# Patient Record
Sex: FEMALE | Race: WHITE
[De-identification: names, ages, dates, MRNs, and addresses within clinical notes are randomized per-mention and may not be internally consistent; named-entity substitution may affect disease eponyms.]

---

## 2020-12-30 ENCOUNTER — HOSPITAL ENCOUNTER (EMERGENCY)
Dept: HOSPITAL 63 - ER | Age: 70
Discharge: HOME | End: 2020-12-30
Payer: MEDICARE

## 2020-12-30 VITALS
DIASTOLIC BLOOD PRESSURE: 71 MMHG | SYSTOLIC BLOOD PRESSURE: 149 MMHG | SYSTOLIC BLOOD PRESSURE: 149 MMHG | DIASTOLIC BLOOD PRESSURE: 71 MMHG | DIASTOLIC BLOOD PRESSURE: 71 MMHG | SYSTOLIC BLOOD PRESSURE: 149 MMHG | SYSTOLIC BLOOD PRESSURE: 149 MMHG | DIASTOLIC BLOOD PRESSURE: 71 MMHG | SYSTOLIC BLOOD PRESSURE: 149 MMHG | DIASTOLIC BLOOD PRESSURE: 71 MMHG

## 2020-12-30 VITALS — HEIGHT: 67 IN | BODY MASS INDEX: 18.55 KG/M2 | WEIGHT: 118.17 LBS

## 2020-12-30 DIAGNOSIS — I10: Primary | ICD-10-CM

## 2020-12-30 DIAGNOSIS — Z88.8: ICD-10-CM

## 2020-12-30 DIAGNOSIS — Z88.7: ICD-10-CM

## 2020-12-30 LAB
ALBUMIN SERPL-MCNC: 3.8 G/DL (ref 3.4–5)
ALBUMIN/GLOB SERPL: 1.3 {RATIO} (ref 1–1.7)
ALP SERPL-CCNC: 81 U/L (ref 46–116)
ALT SERPL-CCNC: 17 U/L (ref 14–59)
ANION GAP SERPL CALC-SCNC: 13 MMOL/L (ref 6–14)
AST SERPL-CCNC: 21 U/L (ref 15–37)
BASOPHILS # BLD AUTO: 0 X10^3/UL (ref 0–0.2)
BASOPHILS NFR BLD: 1 % (ref 0–3)
BILIRUB SERPL-MCNC: 0.5 MG/DL (ref 0.2–1)
BUN/CREAT SERPL: 11 (ref 6–20)
CA-I SERPL ISE-MCNC: 9 MG/DL (ref 7–20)
CALCIUM SERPL-MCNC: 9.8 MG/DL (ref 8.5–10.1)
CHLORIDE SERPL-SCNC: 97 MMOL/L (ref 98–107)
CO2 SERPL-SCNC: 26 MMOL/L (ref 21–32)
CREAT SERPL-MCNC: 0.8 MG/DL (ref 0.6–1)
EOSINOPHIL NFR BLD: 0 X10^3/UL (ref 0–0.7)
EOSINOPHIL NFR BLD: 1 % (ref 0–3)
ERYTHROCYTE [DISTWIDTH] IN BLOOD BY AUTOMATED COUNT: 15.6 % (ref 11.5–14.5)
GFR SERPLBLD BASED ON 1.73 SQ M-ARVRAT: 71.3 ML/MIN
GLOBULIN SER-MCNC: 3 G/DL (ref 2.2–3.8)
GLUCOSE SERPL-MCNC: 112 MG/DL (ref 70–99)
HCT VFR BLD CALC: 43.2 % (ref 36–47)
HGB BLD-MCNC: 14.3 G/DL (ref 12–15.5)
LYMPHOCYTES # BLD: 1.1 X10^3/UL (ref 1–4.8)
LYMPHOCYTES NFR BLD AUTO: 17 % (ref 24–48)
MCH RBC QN AUTO: 33 PG (ref 25–35)
MCHC RBC AUTO-ENTMCNC: 33 G/DL (ref 31–37)
MCV RBC AUTO: 99 FL (ref 79–100)
MONO #: 0.5 X10^3/UL (ref 0–1.1)
MONOCYTES NFR BLD: 8 % (ref 0–9)
NEUT #: 5 X10^3UL (ref 1.8–7.7)
NEUTROPHILS NFR BLD AUTO: 74 % (ref 31–73)
PLATELET # BLD AUTO: 211 X10^3/UL (ref 140–400)
POTASSIUM SERPL-SCNC: 4.2 MMOL/L (ref 3.5–5.1)
PROT SERPL-MCNC: 6.8 G/DL (ref 6.4–8.2)
RBC # BLD AUTO: 4.37 X10^6/UL (ref 3.5–5.4)
SODIUM SERPL-SCNC: 136 MMOL/L (ref 136–145)
WBC # BLD AUTO: 6.7 X10^3/UL (ref 4–11)

## 2020-12-30 PROCEDURE — 80053 COMPREHEN METABOLIC PANEL: CPT

## 2020-12-30 PROCEDURE — 85025 COMPLETE CBC W/AUTO DIFF WBC: CPT

## 2020-12-30 PROCEDURE — 99285 EMERGENCY DEPT VISIT HI MDM: CPT

## 2020-12-30 PROCEDURE — 93005 ELECTROCARDIOGRAM TRACING: CPT

## 2020-12-30 PROCEDURE — 99284 EMERGENCY DEPT VISIT MOD MDM: CPT

## 2020-12-30 PROCEDURE — 84484 ASSAY OF TROPONIN QUANT: CPT

## 2020-12-30 PROCEDURE — 36415 COLL VENOUS BLD VENIPUNCTURE: CPT

## 2020-12-30 NOTE — EKG
[facility] [address], [place] [address]

Test Date:    2020               Test Time:    18:02:44

Pat Name:     BOONE AMEZQUITA         Department:   

Patient ID:   [id number]           Room:          

Gender:       F                        Technician:   OFE

:          1950               Requested By: ALIX DAVIDSON

Order Number: [id number]            Reading MD:   David Garcia

                                 Measurements

Intervals                              Axis          

Rate:         108                      P:            77

OR:           126                      QRS:          60

QRSD:         80                       T:            68

QT:           334                                    

QTc:          451                                    

                           Interpretive Statements

SINUS TACHYCARDIA

QRS(T) CONTOUR ABNORMALITY

CONSISTENT WITH ANTEROSEPTAL INFARCT

PROBABLY OLD

ABNORMAL ECG

RI6.02

No previous ECG available for comparison

Electronically Signed On 2021 14:52:47 CST by David Garcia

## 2020-12-31 NOTE — EKG
[facility] [address], [place] [address]

Test Date:    2020               Test Time:    19:44:04

Pat Name:     BOONE AMEZQUITA         Department:   

Patient ID:   [id number]           Room:          

Gender:       F                        Technician:   OFE

:          1950               Requested By: RENETTA JAY

Order Number: [id number]            Reading MD:   David Garcia

                                 Measurements

Intervals                              Axis          

Rate:         89                       P:            59

GA:           128                      QRS:          24

QRSD:         70                       T:            61

QT:           370                                    

QTc:          451                                    

                           Interpretive Statements

SINUS RHYTHM

QRS(T) CONTOUR ABNORMALITY

CONSISTENT WITH ANTEROSEPTAL INFARCT

AGE UNDETERMINED

ABNORMAL ECG

Electronically Signed On 2021 14:53:30 CST by David Garcia

## 2021-01-08 ENCOUNTER — HOSPITAL ENCOUNTER (OUTPATIENT)
Dept: HOSPITAL 63 - DXRAD | Age: 71
End: 2021-01-08
Attending: PHYSICIAN ASSISTANT
Payer: MEDICARE
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
## 2021-01-08 DIAGNOSIS — S72.8X1A: Primary | ICD-10-CM
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
## 2021-01-08 DIAGNOSIS — Z98.890: ICD-10-CM
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
## 2021-01-08 PROCEDURE — 73552 X-RAY EXAM OF FEMUR 2/>: CPT

## 2021-01-08 PROCEDURE — 73060 X-RAY EXAM OF HUMERUS: CPT

## 2021-01-08 NOTE — RAD
EXAM: Right femur, 2 views; right humerus, 2 views.



HISTORY: Fracture.



COMPARISON: None.



FINDINGS: 



Right femur: 2 views the right femur are obtained. There is internal fixation of a proximal femoral m
etaphyseal fracture within intramedullary simi. There is slight callus formation along the fracture li
ne. No new fracture is seen. There is suspected disuse osteopenia.



Right humerus: 2 views of the right humerus are obtained. There is a mildly displaced fracture of the
 humeral head with involvement of the greater tuberosity. There may be minimal callus formation along
 the fracture line.



IMPRESSION: 

1. Healing proximal right femoral fracture status post internal fixation.

2. Minimal healing of a right humeral head fracture involving the greater tuberosity.



Electronically signed by: Micaela Rich MD (1/8/2021 12:24 PM) [id number]

## 2021-01-08 NOTE — RAD
EXAM: Right femur, 2 views; right humerus, 2 views.



HISTORY: Fracture.



COMPARISON: None.



FINDINGS: 



Right femur: 2 views the right femur are obtained. There is internal fixation of a proximal femoral m
etaphyseal fracture within intramedullary simi. There is slight callus formation along the fracture li
ne. No new fracture is seen. There is suspected disuse osteopenia.



Right humerus: 2 views of the right humerus are obtained. There is a mildly displaced fracture of the
 humeral head with involvement of the greater tuberosity. There may be minimal callus formation along
 the fracture line.



IMPRESSION: 

1. Healing proximal right femoral fracture status post internal fixation.

2. Minimal healing of a right humeral head fracture involving the greater tuberosity.



Electronically signed by: Micaela Rich MD (1/8/2021 12:24 PM) [id number]

## 2021-04-15 ENCOUNTER — HOSPITAL ENCOUNTER (OUTPATIENT)
Dept: HOSPITAL 63 - RAD | Age: 71
End: 2021-04-15
Attending: PHYSICIAN ASSISTANT
Payer: COMMERCIAL

## 2021-04-15 DIAGNOSIS — S42.254D: Primary | ICD-10-CM
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
## 2021-04-15 PROCEDURE — 73030 X-RAY EXAM OF SHOULDER: CPT

## 2021-04-16 NOTE — RAD
EXAM:  XR SHOULDER_RIGHT 2+ VIEWS 4/15/2021 2:57 PM



CLINICAL INDICATION:  Closed nondisplaced fracture greater tuberosity right humerus



COMPARISON:  Right humerus radiograph 1/8/2021



TECHNIQUE:  3 views of the right shoulder



FINDINGS:  The minimally displaced greater tuberosity fracture is unchanged in alignment. The fractur
e line is slightly less conspicuous inferior medially but persists. No significant callus formation. 
There is no new fracture. Alignment is normal. Glenohumeral and acromioclavicular joints are maintain
ed.



IMPRESSION:  Healing greater tuberosity fracture, unchanged alignment.



Electronically signed by: Melissa Franklin MD (4/16/2021 9:52 AM) [id number]

## 2021-06-24 ENCOUNTER — HOSPITAL ENCOUNTER (OUTPATIENT)
Dept: HOSPITAL 63 - RAD | Age: 71
End: 2021-06-24
Attending: PHYSICIAN ASSISTANT
Payer: COMMERCIAL

## 2021-06-24 DIAGNOSIS — M47.22: Primary | ICD-10-CM

## 2021-06-24 DIAGNOSIS — M43.22: ICD-10-CM

## 2021-06-24 DIAGNOSIS — M25.811: ICD-10-CM

## 2021-06-24 DIAGNOSIS — M43.12: ICD-10-CM

## 2021-06-24 DIAGNOSIS — M48.02: ICD-10-CM

## 2021-06-24 PROCEDURE — 73030 X-RAY EXAM OF SHOULDER: CPT

## 2021-06-24 PROCEDURE — 72040 X-RAY EXAM NECK SPINE 2-3 VW: CPT

## 2021-06-24 NOTE — RAD
XR CERVICAL SPINE 2-3V



Clinical Indication: Reason: NECK PAIN, RIGHT SHOULDER PAIN / Spl. Instructions:  / History: 



Comparison: None.



Findings: 

There is minimal grade 1 anterolisthesis of C3 on C4. There is mild grade 1 anterolisthesis of C4 on 
C5. There is minimal grade 1 anterolisthesis of C7 on T1. Alignment is otherwise maintained. There is
 mild disc space narrowing of C5/C6 and C6/C7. There is also degenerative endplate spurring at these 
levels. The other disc spaces are relatively maintained. The prevertebral soft tissues are normal. Th
ere is fusion of the C3/C4 facet joints. There is multilevel uncinate process hypertrophy. The lung a
pices are clear. The lateral masses of C1 are symmetric. The odontoid is intact. No acute fracture is
 identified.



IMPRESSION:

1.  Moderate degenerative spondylosis most advanced at C5/C6 and C6/C7.

2.  The C3/C4 facet joints are fused.



Electronically signed by: Omari Sherman MD (6/24/2021 5:29 PM) RON

## 2021-06-24 NOTE — RAD
Examination: 3 views of the right shoulder



HISTORY: History of neck pain, right shoulder pain



COMPARISON: 4/15/2021





Findings/

impression:





Mild joint space loss identified in the glenohumeral joint, acromioclavicular joint. There is no acut
e fracture or dislocation identified. Healed fracture of the greater tuberosity of the humerus.



Electronically signed by: Dick Conteh MD (6/24/2021 4:37 PM) [id number]

## 2021-08-12 ENCOUNTER — HOSPITAL ENCOUNTER (OUTPATIENT)
Dept: HOSPITAL 63 - RAD | Age: 71
End: 2021-08-12
Attending: PHYSICIAN ASSISTANT
Payer: MEDICARE

## 2021-08-12 DIAGNOSIS — S42.254D: Primary | ICD-10-CM
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
## 2021-08-12 PROCEDURE — 73030 X-RAY EXAM OF SHOULDER: CPT

## 2021-08-12 NOTE — RAD
EXAM: RIGHT SHOULDER 3 VIEWS.



HISTORY: Fracture follow-up.



COMPARISON: 06/24/2021.



FINDINGS: A nondisplaced fracture along the posterior aspect of the greater tuberosity demonstrates p
eriosteal reaction consistent with healing. Alignment is near-anatomic.



Osteopenia is at least moderate. The glenohumeral joint space appears mildly narrowed. Glenohumeral a
lignment is maintained. Acromioclavicular joint spaces and alignment are maintained for patient age.



IMPRESSION:

1. Healing fracture of the right greater tuberosity in expected alignment.

2. Suspect mild right glenohumeral osteoarthritis.



Electronically signed by: GEORGE Metcalf MD (8/12/2021 4:38 PM) [id number]